# Patient Record
Sex: FEMALE | Race: WHITE | NOT HISPANIC OR LATINO | Employment: OTHER | URBAN - METROPOLITAN AREA
[De-identification: names, ages, dates, MRNs, and addresses within clinical notes are randomized per-mention and may not be internally consistent; named-entity substitution may affect disease eponyms.]

---

## 2021-10-18 ENCOUNTER — PREPPED CHART (OUTPATIENT)
Dept: URBAN - METROPOLITAN AREA CLINIC 19 | Facility: CLINIC | Age: 81
End: 2021-10-18

## 2022-04-19 ENCOUNTER — FOLLOW UP (OUTPATIENT)
Dept: URBAN - METROPOLITAN AREA CLINIC 19 | Facility: CLINIC | Age: 82
End: 2022-04-19

## 2022-04-19 DIAGNOSIS — S05.02XA: ICD-10-CM

## 2022-04-19 DIAGNOSIS — H57.12: ICD-10-CM

## 2022-04-19 PROCEDURE — 92071 CONTACT LENS FITTING FOR TX: CPT

## 2022-04-19 PROCEDURE — 99213 OFFICE O/P EST LOW 20 MIN: CPT

## 2022-04-19 ASSESSMENT — VISUAL ACUITY
OD_PH: 20/200
OD_CC: 20/400
OS_CC: 20/200
OU_CC: 20/100-2

## 2022-04-19 ASSESSMENT — TONOMETRY
OS_IOP_MMHG: 16
OD_IOP_MMHG: 10

## 2022-04-19 NOTE — PATIENT DISCUSSION
besivance tid OS, bandage cl inserted today.  keep in until Friday.  see Dr. Geovany Mejia next available.

## 2022-04-19 NOTE — PROCEDURE NOTE: CLINICAL
PROCEDURE NOTE: Bandage Contact Lens #1 OS. Diagnosis: Corneal Abrasion. A therapeutic soft contact lens of the of the appropriate size and base curve was selected and then applied to the cornea. The lens fit well and moved appropriately. Tara Moore

## 2022-04-22 ENCOUNTER — FOLLOW UP (OUTPATIENT)
Dept: URBAN - METROPOLITAN AREA CLINIC 19 | Facility: CLINIC | Age: 82
End: 2022-04-22

## 2022-04-22 DIAGNOSIS — H04.123: ICD-10-CM

## 2022-04-22 DIAGNOSIS — S05.02XD: ICD-10-CM

## 2022-04-22 PROCEDURE — 99213 OFFICE O/P EST LOW 20 MIN: CPT

## 2022-04-22 RX ORDER — ERYTHROMYCIN 5 MG/G: OINTMENT OPHTHALMIC TWICE A DAY

## 2022-04-22 ASSESSMENT — VISUAL ACUITY: OS_CC: 20/200

## 2022-04-22 NOTE — PATIENT DISCUSSION
bandage cl remove, continue artifical tears, start marifer for 1 week.  refer back to Dr. Flynn Nickerson for further eval.

## 2022-06-03 ENCOUNTER — FOLLOW UP (OUTPATIENT)
Dept: URBAN - METROPOLITAN AREA CLINIC 19 | Facility: CLINIC | Age: 82
End: 2022-06-03

## 2022-06-03 DIAGNOSIS — H04.123: ICD-10-CM

## 2022-06-03 DIAGNOSIS — H40.1133: ICD-10-CM

## 2022-06-03 DIAGNOSIS — H35.052: ICD-10-CM

## 2022-06-03 DIAGNOSIS — H18.833: ICD-10-CM

## 2022-06-03 DIAGNOSIS — H02.054: ICD-10-CM

## 2022-06-03 PROCEDURE — 99214 OFFICE O/P EST MOD 30 MIN: CPT

## 2022-06-03 RX ORDER — CYCLOSPORINE 0.5 MG/ML: 1 EMULSION OPHTHALMIC TWICE A DAY

## 2022-06-03 ASSESSMENT — VISUAL ACUITY
OS_CC: 20/200+1
OD_CC: CF 5FT

## 2022-06-03 ASSESSMENT — TONOMETRY
OS_IOP_MMHG: 12
OD_IOP_MMHG: 11

## 2022-06-03 NOTE — PATIENT DISCUSSION
(Stable, CPM) Intraocular pressures are well controlled with current treatment. Patient is tolerating the medications well. Continue with same treatment regimen.

## 2022-06-03 NOTE — PATIENT DISCUSSION
** Stop: Latanoprost -- due to eye irritation.  Will bring back for IOP check in a few weeks to make sure IOP is still stable.

## 2022-06-24 ENCOUNTER — ESTABLISHED PATIENT (OUTPATIENT)
Dept: URBAN - METROPOLITAN AREA CLINIC 20 | Facility: CLINIC | Age: 82
End: 2022-06-24

## 2022-06-24 DIAGNOSIS — H52.4: ICD-10-CM

## 2022-06-24 PROCEDURE — 92015 DETERMINE REFRACTIVE STATE: CPT

## 2022-06-24 PROCEDURE — 99211NC NO CHARGE VISIT

## 2022-06-24 ASSESSMENT — KERATOMETRY
OD_AXISANGLE2_DEGREES: 33
OS_K2POWER_DIOPTERS: 44.75
OD_K1POWER_DIOPTERS: 41.50
OS_K1POWER_DIOPTERS: 43.25
OS_AXISANGLE_DEGREES: 32
OD_AXISANGLE_DEGREES: 123
OD_K2POWER_DIOPTERS: 45.00
OS_AXISANGLE2_DEGREES: 122

## 2022-06-24 ASSESSMENT — TONOMETRY
OS_IOP_MMHG: 11
OD_IOP_MMHG: 11

## 2022-06-24 ASSESSMENT — VISUAL ACUITY
OU_SC: 20/60-2
OD_CC: 20/200
OS_CC: 20/100

## 2022-06-24 NOTE — PATIENT DISCUSSION
MRx only today; no change to Rx. advised switch to DVO/NVO, low vision referral if difficulty persists.

## 2022-08-05 ENCOUNTER — FOLLOW UP (OUTPATIENT)
Dept: URBAN - METROPOLITAN AREA CLINIC 19 | Facility: CLINIC | Age: 82
End: 2022-08-05

## 2022-08-05 DIAGNOSIS — H02.054: ICD-10-CM

## 2022-08-05 DIAGNOSIS — H04.123: ICD-10-CM

## 2022-08-05 DIAGNOSIS — H18.833: ICD-10-CM

## 2022-08-05 DIAGNOSIS — H40.1133: ICD-10-CM

## 2022-08-05 PROCEDURE — 99214 OFFICE O/P EST MOD 30 MIN: CPT

## 2022-08-05 ASSESSMENT — TONOMETRY
OD_IOP_MMHG: 10
OS_IOP_MMHG: 16

## 2022-08-05 ASSESSMENT — VISUAL ACUITY
OU_SC: 20/100
OD_SC: 20/200
OS_SC: 20/200

## 2022-08-26 ENCOUNTER — CONSULTATION/EVALUATION (OUTPATIENT)
Dept: URBAN - METROPOLITAN AREA CLINIC 20 | Facility: CLINIC | Age: 82
End: 2022-08-26

## 2022-08-26 DIAGNOSIS — H18.833: ICD-10-CM

## 2022-08-26 DIAGNOSIS — H18.231: ICD-10-CM

## 2022-08-26 PROCEDURE — 76514 ECHO EXAM OF EYE THICKNESS: CPT

## 2022-08-26 PROCEDURE — 99214 OFFICE O/P EST MOD 30 MIN: CPT

## 2022-08-26 ASSESSMENT — VISUAL ACUITY
OU_CC: 20/100-2
OU_SC: 20/200
OD_CC: 20/200+2
OS_CC: 20/100-2

## 2022-08-26 ASSESSMENT — TONOMETRY
OD_IOP_MMHG: 13
OS_IOP_MMHG: 18

## 2022-08-26 NOTE — PATIENT DISCUSSION
"** When reviewing her drops before leaving, she pulled out a bottle of Gentamicin and asked if they were the correct ones because those were what she has been using for ""a little while"".  I expect she has not been using the Pred Acetate at all

## 2022-08-26 NOTE — PATIENT DISCUSSION
* Patient was concerned about an elevated IOP at the last appointment with Dr. Trent Fonseca this week.  However, today she is stable and no abnormal findings related to IOP or nerve findings today.

## 2022-08-26 NOTE — PATIENT DISCUSSION
** Patient asked about a dermatologist recommendation from Dr. Penny Lacey He said [facility] is good and has a local office-- Dr. Rohan Gimenez -- The patient stated she already has seen that office and their wait is very long, but wanted another option as well to try to get in sooner.  Dr. Yen Patel said any others in the area  would be fine and once she gets an appointment to let us know to be able to send notes to them.

## 2022-08-26 NOTE — PATIENT DISCUSSION
* Patient shows increase in corneal edema since last visit on 8/5/22.   Referring her back to see Dr. Gudelia GARNETT to take a look.

## 2022-09-13 ENCOUNTER — FOLLOW UP (OUTPATIENT)
Dept: URBAN - METROPOLITAN AREA CLINIC 20 | Facility: CLINIC | Age: 82
End: 2022-09-13

## 2022-09-13 DIAGNOSIS — H18.231: ICD-10-CM

## 2022-09-13 DIAGNOSIS — H18.833: ICD-10-CM

## 2022-09-13 DIAGNOSIS — H02.054: ICD-10-CM

## 2022-09-13 DIAGNOSIS — H40.1133: ICD-10-CM

## 2022-09-13 DIAGNOSIS — Z94.7: ICD-10-CM

## 2022-09-13 DIAGNOSIS — H04.123: ICD-10-CM

## 2022-09-13 PROCEDURE — 92020 GONIOSCOPY: CPT

## 2022-09-13 PROCEDURE — 76514 ECHO EXAM OF EYE THICKNESS: CPT

## 2022-09-13 PROCEDURE — 99214 OFFICE O/P EST MOD 30 MIN: CPT

## 2022-09-13 RX ORDER — LATANOPROST 50 UG/ML
1 SOLUTION/ DROPS OPHTHALMIC EVERY EVENING
Start: 2022-09-13

## 2022-09-13 RX ORDER — DORZOLAMIDE HYDROCHLORIDE TIMOLOL MALEATE 20; 5 MG/ML; MG/ML
1 SOLUTION/ DROPS OPHTHALMIC TWICE A DAY
Start: 2022-09-13

## 2022-09-13 ASSESSMENT — VISUAL ACUITY
OS_CC: 20/100-1
OU_CC: 20/80-2
OD_CC: 20/200

## 2022-09-13 ASSESSMENT — TONOMETRY
OD_IOP_MMHG: 11
OS_IOP_MMHG: 32
OS_IOP_MMHG: 27

## 2022-09-13 NOTE — PATIENT DISCUSSION
* IOP elevated today OS.  Changing drop protocol based on pressure spike.  Will see back in 1 week for IOP.

## 2022-09-13 NOTE — PATIENT DISCUSSION
* Patient still shows increase in corneal edema (Pach ).    Pt saw Dr. Nida Landau on 8/31/22-- see notes--.

## 2022-09-23 ENCOUNTER — FOLLOW UP (OUTPATIENT)
Dept: URBAN - METROPOLITAN AREA CLINIC 20 | Facility: CLINIC | Age: 82
End: 2022-09-23

## 2022-09-23 DIAGNOSIS — H18.833: ICD-10-CM

## 2022-09-23 DIAGNOSIS — H04.123: ICD-10-CM

## 2022-09-23 DIAGNOSIS — H18.231: ICD-10-CM

## 2022-09-23 DIAGNOSIS — H40.1133: ICD-10-CM

## 2022-09-23 DIAGNOSIS — Z94.7: ICD-10-CM

## 2022-09-23 DIAGNOSIS — H02.054: ICD-10-CM

## 2022-09-23 PROCEDURE — 99213 OFFICE O/P EST LOW 20 MIN: CPT

## 2022-09-23 ASSESSMENT — TONOMETRY
OS_IOP_MMHG: 18
OD_IOP_MMHG: 10
OD_IOP_MMHG: 15

## 2022-09-23 ASSESSMENT — VISUAL ACUITY
OS_CC: 20/100
OU_CC: 20/80-2
OD_CC: 20/200

## 2022-09-23 NOTE — PATIENT DISCUSSION
* Patient still shows increase in corneal edema (Pach ).    Pt saw Dr. Sj Lou on 8/31/22-- see notes--.

## 2022-09-23 NOTE — PATIENT DISCUSSION
* IOP stable today, has responded well to drops added at last visit to OS.  Will continue at least until the patient can get in to see Dr. Dino Toure in November.

## 2022-09-23 NOTE — PATIENT DISCUSSION
* Dr. Jeannette Vidal saw patient on 8/31/22.  He is working on getting pt in with [facility] (Dr. Guido Colin) for opinion on tube placement status in OD.

## 2022-09-23 NOTE — PATIENT DISCUSSION
** Pt waiting for Klarity CL to be off back order.  We will put her on these for long term use// for now she can stay on Pred Acetate until is is off back order and available.

## 2022-10-07 ENCOUNTER — FOLLOW UP (OUTPATIENT)
Dept: URBAN - METROPOLITAN AREA CLINIC 19 | Facility: CLINIC | Age: 82
End: 2022-10-07

## 2022-10-07 DIAGNOSIS — H40.1133: ICD-10-CM

## 2022-10-07 PROCEDURE — 92012 INTRM OPH EXAM EST PATIENT: CPT

## 2022-10-07 PROCEDURE — 76514 ECHO EXAM OF EYE THICKNESS: CPT

## 2022-10-07 ASSESSMENT — VISUAL ACUITY
OS_CC: 20/200
OD_CC: 20/400

## 2022-10-07 ASSESSMENT — TONOMETRY
OD_IOP_MMHG: 9
OS_IOP_MMHG: 17

## 2022-10-07 NOTE — PATIENT DISCUSSION
PT did see DR Dino Toure. Dr Giorgi Damian and DR Dino Toure will be conferring on next move in PT care.

## 2022-10-28 ENCOUNTER — FOLLOW UP (OUTPATIENT)
Dept: URBAN - METROPOLITAN AREA CLINIC 20 | Facility: CLINIC | Age: 82
End: 2022-10-28

## 2022-10-28 DIAGNOSIS — H40.1133: ICD-10-CM

## 2022-10-28 DIAGNOSIS — H18.833: ICD-10-CM

## 2022-10-28 DIAGNOSIS — H01.02B: ICD-10-CM

## 2022-10-28 DIAGNOSIS — H04.123: ICD-10-CM

## 2022-10-28 DIAGNOSIS — H35.052: ICD-10-CM

## 2022-10-28 DIAGNOSIS — H01.02A: ICD-10-CM

## 2022-10-28 DIAGNOSIS — Z94.7: ICD-10-CM

## 2022-10-28 PROCEDURE — 92134 CPTRZ OPH DX IMG PST SGM RTA: CPT

## 2022-10-28 PROCEDURE — 99213 OFFICE O/P EST LOW 20 MIN: CPT

## 2022-10-28 ASSESSMENT — VISUAL ACUITY
OD_CC: 20/200
OU_CC: 20/200+1
OS_CC: 20/200+1

## 2022-10-28 ASSESSMENT — TONOMETRY
OD_IOP_MMHG: 10
OS_IOP_MMHG: 24
OD_IOP_MMHG: 10
OS_IOP_MMHG: 25

## 2022-10-28 NOTE — PATIENT DISCUSSION
* Dr Sherrie Guan agreed to move the shunt tube in anticipation of Corneal Transplant #2.  -- Will send an updated note to Dr. Sherrie Guan regarding the recent higher IOP measurements.

## 2022-10-28 NOTE — PATIENT DISCUSSION
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 Nida Landau.

## 2022-11-04 ENCOUNTER — EMERGENCY VISIT (OUTPATIENT)
Dept: URBAN - METROPOLITAN AREA CLINIC 20 | Facility: CLINIC | Age: 82
End: 2022-11-04

## 2022-11-04 DIAGNOSIS — Z94.7: ICD-10-CM

## 2022-11-04 DIAGNOSIS — H01.02B: ICD-10-CM

## 2022-11-04 DIAGNOSIS — H40.1133: ICD-10-CM

## 2022-11-04 DIAGNOSIS — H04.123: ICD-10-CM

## 2022-11-04 DIAGNOSIS — H01.02A: ICD-10-CM

## 2022-11-04 DIAGNOSIS — H35.052: ICD-10-CM

## 2022-11-04 DIAGNOSIS — H18.833: ICD-10-CM

## 2022-11-04 PROCEDURE — 99213 OFFICE O/P EST LOW 20 MIN: CPT

## 2022-11-04 PROCEDURE — 92134 CPTRZ OPH DX IMG PST SGM RTA: CPT

## 2022-11-04 ASSESSMENT — VISUAL ACUITY
OU_CC: 20/100
OS_CC: 20/100
OD_CC: 20/400

## 2022-11-04 ASSESSMENT — TONOMETRY
OD_IOP_MMHG: 9
OS_IOP_MMHG: 14

## 2022-11-04 NOTE — PATIENT DISCUSSION
* Dr Thais Fabry agreed to move the shunt tube in anticipation of Corneal Transplant #2.  -- Will send an updated note to Dr. Thais Fabry regarding the recent higher IOP measurements.

## 2022-11-04 NOTE — PATIENT DISCUSSION
fluctuating/dec vision OS noted by pt most c/w dryness as all other ocular health findings, including M.OCT appear stable at this time. re-advised importance of compliance w/ dryness tx and appropriate f/u w/ Dr Danna Elizabeth and Rocky Citizen as instructed.

## 2022-11-22 NOTE — PATIENT DISCUSSION
* Dr. Nida Landau saw patient on 8/31/22.  He is working on getting pt in with [facility] (Dr. Angie Benitez) for opinion on tube placement status in OD. eyeglasses

## 2022-12-02 ENCOUNTER — ESTABLISHED PATIENT (OUTPATIENT)
Dept: URBAN - METROPOLITAN AREA CLINIC 19 | Facility: CLINIC | Age: 82
End: 2022-12-02

## 2022-12-02 PROCEDURE — 99214 OFFICE O/P EST MOD 30 MIN: CPT

## 2022-12-02 RX ORDER — PILOCARPINE HYDROCHLORIDE 12.5 MG/ML
1 SOLUTION/ DROPS OPHTHALMIC TWICE A DAY
Start: 2022-12-02

## 2022-12-02 ASSESSMENT — VISUAL ACUITY
OU_SC: J16
OS_CC: 20/150-1
OU_CC: 20/150+1

## 2022-12-02 ASSESSMENT — TONOMETRY
OS_IOP_MMHG: 9
OD_IOP_MMHG: 10

## 2022-12-02 NOTE — PATIENT DISCUSSION
* Symptoms consistent with possible Temporal Arteritis.  -- Pain on left temporal side of head/ Light sensitivity OS -- Will send Referral to Dr. Justin Cooper for appt.

## 2022-12-02 NOTE — PATIENT DISCUSSION
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 Tiera Bridges.

## 2023-01-06 ENCOUNTER — ESTABLISHED PATIENT (OUTPATIENT)
Dept: URBAN - METROPOLITAN AREA CLINIC 19 | Facility: CLINIC | Age: 83
End: 2023-01-06

## 2023-01-06 DIAGNOSIS — H01.02A: ICD-10-CM

## 2023-01-06 DIAGNOSIS — M31.6: ICD-10-CM

## 2023-01-06 DIAGNOSIS — H18.231: ICD-10-CM

## 2023-01-06 DIAGNOSIS — Z94.7: ICD-10-CM

## 2023-01-06 DIAGNOSIS — H04.123: ICD-10-CM

## 2023-01-06 DIAGNOSIS — H01.02B: ICD-10-CM

## 2023-01-06 DIAGNOSIS — H40.1133: ICD-10-CM

## 2023-01-06 DIAGNOSIS — H35.052: ICD-10-CM

## 2023-01-06 PROCEDURE — 99213 OFFICE O/P EST LOW 20 MIN: CPT

## 2023-01-06 ASSESSMENT — TONOMETRY
OD_IOP_MMHG: 7
OS_IOP_MMHG: 11

## 2023-01-06 ASSESSMENT — VISUAL ACUITY: OS_CC: 20/150

## 2023-01-06 NOTE — PATIENT DISCUSSION
* Symptoms consistent with possible Temporal Arteritis.  -- Pain on left temporal side of head/ Light sensitivity OS -- Will send Referral to Dr. Bette Donovan for appt.

## 2023-04-14 ENCOUNTER — ESTABLISHED PATIENT (OUTPATIENT)
Dept: URBAN - METROPOLITAN AREA CLINIC 20 | Facility: CLINIC | Age: 83
End: 2023-04-14

## 2023-04-14 DIAGNOSIS — H35.052: ICD-10-CM

## 2023-04-14 DIAGNOSIS — Z94.7: ICD-10-CM

## 2023-04-14 PROCEDURE — 99213 OFFICE O/P EST LOW 20 MIN: CPT

## 2023-04-14 ASSESSMENT — VISUAL ACUITY
OD_SC: 20/400
OS_SC: 20/200

## 2023-04-14 ASSESSMENT — TONOMETRY
OS_IOP_MMHG: 14
OD_IOP_MMHG: 9

## 2023-07-07 ENCOUNTER — ESTABLISHED PATIENT (OUTPATIENT)
Dept: URBAN - METROPOLITAN AREA CLINIC 20 | Facility: CLINIC | Age: 83
End: 2023-07-07

## 2023-07-07 DIAGNOSIS — H02.054: ICD-10-CM

## 2023-07-07 PROCEDURE — 92012 INTRM OPH EXAM EST PATIENT: CPT

## 2023-07-07 ASSESSMENT — VISUAL ACUITY
OD_SC: 20/400
OS_SC: 20/200
OU_SC: 20/200

## 2023-07-07 ASSESSMENT — TONOMETRY
OS_IOP_MMHG: 17
OD_IOP_MMHG: 11

## 2023-07-12 ENCOUNTER — ESTABLISHED PATIENT (OUTPATIENT)
Dept: URBAN - METROPOLITAN AREA CLINIC 20 | Facility: CLINIC | Age: 83
End: 2023-07-12

## 2023-07-12 DIAGNOSIS — H43.813: ICD-10-CM

## 2023-07-12 DIAGNOSIS — H44.23: ICD-10-CM

## 2023-07-12 DIAGNOSIS — H47.293: ICD-10-CM

## 2023-07-12 DIAGNOSIS — H31.011: ICD-10-CM

## 2023-07-12 PROCEDURE — 92014 COMPRE OPH EXAM EST PT 1/>: CPT

## 2023-07-12 PROCEDURE — 92201 OPSCPY EXTND RTA DRAW UNI/BI: CPT

## 2023-07-12 PROCEDURE — 92134 CPTRZ OPH DX IMG PST SGM RTA: CPT

## 2023-07-12 ASSESSMENT — VISUAL ACUITY
OS_SC: 20/200
OD_SC: 20/200
OU_SC: 20/200

## 2023-07-12 ASSESSMENT — TONOMETRY
OD_IOP_MMHG: 10
OS_IOP_MMHG: 12

## 2023-07-26 ENCOUNTER — ESTABLISHED PATIENT (OUTPATIENT)
Dept: URBAN - METROPOLITAN AREA CLINIC 20 | Facility: CLINIC | Age: 83
End: 2023-07-26

## 2023-07-26 DIAGNOSIS — H47.293: ICD-10-CM

## 2023-07-26 DIAGNOSIS — H31.011: ICD-10-CM

## 2023-07-26 DIAGNOSIS — H43.813: ICD-10-CM

## 2023-07-26 DIAGNOSIS — H44.23: ICD-10-CM

## 2023-07-26 PROCEDURE — 92134 CPTRZ OPH DX IMG PST SGM RTA: CPT

## 2023-07-26 PROCEDURE — 99213 OFFICE O/P EST LOW 20 MIN: CPT

## 2023-07-26 ASSESSMENT — TONOMETRY
OD_IOP_MMHG: 15
OS_IOP_MMHG: 12

## 2023-07-26 ASSESSMENT — VISUAL ACUITY
OS_SC: 20/200
OD_SC: 20/200

## 2023-08-09 ENCOUNTER — ESTABLISHED PATIENT (OUTPATIENT)
Dept: URBAN - METROPOLITAN AREA CLINIC 20 | Facility: CLINIC | Age: 83
End: 2023-08-09

## 2023-08-09 DIAGNOSIS — H44.23: ICD-10-CM

## 2023-08-09 DIAGNOSIS — H43.813: ICD-10-CM

## 2023-08-09 DIAGNOSIS — H31.011: ICD-10-CM

## 2023-08-09 DIAGNOSIS — H47.293: ICD-10-CM

## 2023-08-09 PROCEDURE — 99213 OFFICE O/P EST LOW 20 MIN: CPT

## 2023-08-09 ASSESSMENT — TONOMETRY
OS_IOP_MMHG: 16
OD_IOP_MMHG: 14

## 2023-08-09 ASSESSMENT — VISUAL ACUITY
OS_CC: 20/200
OD_PH: 20/200-1
OD_CC: 20/400

## 2023-09-06 ENCOUNTER — ESTABLISHED PATIENT (OUTPATIENT)
Dept: URBAN - METROPOLITAN AREA CLINIC 20 | Facility: CLINIC | Age: 83
End: 2023-09-06

## 2023-09-06 DIAGNOSIS — H31.011: ICD-10-CM

## 2023-09-06 DIAGNOSIS — H47.293: ICD-10-CM

## 2023-09-06 DIAGNOSIS — H43.813: ICD-10-CM

## 2023-09-06 DIAGNOSIS — H44.23: ICD-10-CM

## 2023-09-06 PROCEDURE — 92134 CPTRZ OPH DX IMG PST SGM RTA: CPT

## 2023-09-06 PROCEDURE — 99213 OFFICE O/P EST LOW 20 MIN: CPT

## 2023-09-06 ASSESSMENT — VISUAL ACUITY
OD_SC: 20/400
OS_SC: 20/400
OS_PH: 20/200

## 2023-09-06 ASSESSMENT — TONOMETRY
OD_IOP_MMHG: 10
OS_IOP_MMHG: 14

## 2023-09-27 ENCOUNTER — FOLLOW UP (OUTPATIENT)
Dept: URBAN - METROPOLITAN AREA CLINIC 20 | Facility: CLINIC | Age: 83
End: 2023-09-27

## 2023-09-27 DIAGNOSIS — H44.23: ICD-10-CM

## 2023-09-27 DIAGNOSIS — H31.011: ICD-10-CM

## 2023-09-27 DIAGNOSIS — H47.293: ICD-10-CM

## 2023-09-27 DIAGNOSIS — H43.813: ICD-10-CM

## 2023-09-27 DIAGNOSIS — H53.9: ICD-10-CM

## 2023-09-27 PROCEDURE — 99213 OFFICE O/P EST LOW 20 MIN: CPT

## 2023-09-27 PROCEDURE — 92201 OPSCPY EXTND RTA DRAW UNI/BI: CPT

## 2023-09-27 PROCEDURE — 92134 CPTRZ OPH DX IMG PST SGM RTA: CPT

## 2023-09-27 ASSESSMENT — TONOMETRY
OD_IOP_MMHG: 9
OS_IOP_MMHG: 10

## 2023-09-27 ASSESSMENT — VISUAL ACUITY
OD_SC: CF 5FT
OS_SC: CF 5FT

## 2023-10-18 ENCOUNTER — FOLLOW UP (OUTPATIENT)
Dept: URBAN - METROPOLITAN AREA CLINIC 20 | Facility: CLINIC | Age: 83
End: 2023-10-18

## 2023-10-18 DIAGNOSIS — H44.23: ICD-10-CM

## 2023-10-18 DIAGNOSIS — H47.293: ICD-10-CM

## 2023-10-18 DIAGNOSIS — H31.011: ICD-10-CM

## 2023-10-18 DIAGNOSIS — H43.813: ICD-10-CM

## 2023-10-18 PROCEDURE — 99213 OFFICE O/P EST LOW 20 MIN: CPT

## 2023-10-18 PROCEDURE — 92134 CPTRZ OPH DX IMG PST SGM RTA: CPT

## 2023-10-18 ASSESSMENT — TONOMETRY
OD_IOP_MMHG: 08
OS_IOP_MMHG: 10

## 2023-10-18 ASSESSMENT — VISUAL ACUITY: OS_SC: 20/200

## 2023-10-30 ENCOUNTER — FOLLOW UP (OUTPATIENT)
Dept: URBAN - METROPOLITAN AREA CLINIC 20 | Facility: CLINIC | Age: 83
End: 2023-10-30

## 2023-10-30 DIAGNOSIS — H16.141: ICD-10-CM

## 2023-10-30 PROCEDURE — 99213 OFFICE O/P EST LOW 20 MIN: CPT

## 2023-10-30 PROCEDURE — 65778 COVER EYE W/MEMBRANE: CPT

## 2023-10-30 ASSESSMENT — VISUAL ACUITY
OD_SC: 20/200
OS_SC: 20/200

## 2023-10-30 ASSESSMENT — TONOMETRY
OS_IOP_MMHG: 10
OD_IOP_MMHG: 10

## 2023-11-06 ENCOUNTER — FOLLOW UP (OUTPATIENT)
Dept: URBAN - METROPOLITAN AREA CLINIC 20 | Facility: CLINIC | Age: 83
End: 2023-11-06

## 2023-11-06 DIAGNOSIS — H16.141: ICD-10-CM

## 2023-11-06 PROCEDURE — 99024 POSTOP FOLLOW-UP VISIT: CPT

## 2023-11-06 ASSESSMENT — VISUAL ACUITY
OS_SC: 20/200
OD_SC: 20/200

## 2023-11-06 ASSESSMENT — TONOMETRY
OD_IOP_MMHG: 10
OS_IOP_MMHG: 12

## 2023-11-27 ENCOUNTER — FOLLOW UP (OUTPATIENT)
Dept: URBAN - METROPOLITAN AREA CLINIC 20 | Facility: CLINIC | Age: 83
End: 2023-11-27

## 2023-11-27 DIAGNOSIS — H16.141: ICD-10-CM

## 2023-11-27 PROCEDURE — 99213 OFFICE O/P EST LOW 20 MIN: CPT

## 2023-11-27 ASSESSMENT — TONOMETRY
OD_IOP_MMHG: 7
OS_IOP_MMHG: 9

## 2023-11-27 ASSESSMENT — VISUAL ACUITY
OD_SC: 20/400
OS_SC: 20/200

## 2023-12-04 ENCOUNTER — FOLLOW UP (OUTPATIENT)
Dept: URBAN - METROPOLITAN AREA CLINIC 19 | Facility: CLINIC | Age: 83
End: 2023-12-04

## 2023-12-04 DIAGNOSIS — H52.13: ICD-10-CM

## 2023-12-04 PROCEDURE — 92015 DETERMINE REFRACTIVE STATE: CPT

## 2023-12-04 ASSESSMENT — KERATOMETRY
OD_AXISANGLE_DEGREES: 165
OS_AXISANGLE_DEGREES: 15
OS_AXISANGLE2_DEGREES: 105
OD_K2POWER_DIOPTERS: 54.00
OD_K1POWER_DIOPTERS: 47.00
OS_K1POWER_DIOPTERS: 44.00
OS_K2POWER_DIOPTERS: 46.25
OD_AXISANGLE2_DEGREES: 75

## 2023-12-04 ASSESSMENT — VISUAL ACUITY
OU_CC: 20/100
OD_CC: CF 2FT
OU_CC: 20/100
OD_CC: CF 2FT
OS_CC: 20/200+1
OS_CC: 20/200+1

## 2023-12-06 ENCOUNTER — FOLLOW UP (OUTPATIENT)
Dept: URBAN - METROPOLITAN AREA CLINIC 20 | Facility: CLINIC | Age: 83
End: 2023-12-06

## 2023-12-06 DIAGNOSIS — H47.293: ICD-10-CM

## 2023-12-06 DIAGNOSIS — H43.813: ICD-10-CM

## 2023-12-06 DIAGNOSIS — H31.011: ICD-10-CM

## 2023-12-06 PROCEDURE — 99213 OFFICE O/P EST LOW 20 MIN: CPT

## 2023-12-06 PROCEDURE — 92134 CPTRZ OPH DX IMG PST SGM RTA: CPT

## 2023-12-06 ASSESSMENT — VISUAL ACUITY
OD_SC: CF 2FT
OS_PH: 20/100-1
OS_SC: 20/200

## 2023-12-06 ASSESSMENT — TONOMETRY
OS_IOP_MMHG: 9
OD_IOP_MMHG: 9

## 2024-01-31 ENCOUNTER — FOLLOW UP (OUTPATIENT)
Dept: URBAN - METROPOLITAN AREA CLINIC 20 | Facility: CLINIC | Age: 84
End: 2024-01-31

## 2024-01-31 DIAGNOSIS — H31.011: ICD-10-CM

## 2024-01-31 DIAGNOSIS — H47.293: ICD-10-CM

## 2024-01-31 DIAGNOSIS — H43.813: ICD-10-CM

## 2024-01-31 DIAGNOSIS — H44.23: ICD-10-CM

## 2024-01-31 DIAGNOSIS — H44.2A2: ICD-10-CM

## 2024-01-31 PROCEDURE — 99213 OFFICE O/P EST LOW 20 MIN: CPT

## 2024-01-31 PROCEDURE — 92134 CPTRZ OPH DX IMG PST SGM RTA: CPT

## 2024-01-31 ASSESSMENT — TONOMETRY
OD_IOP_MMHG: 7
OS_IOP_MMHG: 9

## 2024-01-31 ASSESSMENT — VISUAL ACUITY: OS_CC: CF 3FT

## 2024-03-06 ENCOUNTER — COMPREHENSIVE EXAM (OUTPATIENT)
Dept: URBAN - METROPOLITAN AREA CLINIC 20 | Facility: CLINIC | Age: 84
End: 2024-03-06

## 2024-03-06 DIAGNOSIS — H43.813: ICD-10-CM

## 2024-03-06 DIAGNOSIS — H47.293: ICD-10-CM

## 2024-03-06 DIAGNOSIS — H40.1133: ICD-10-CM

## 2024-03-06 DIAGNOSIS — H31.011: ICD-10-CM

## 2024-03-06 DIAGNOSIS — H44.2A2: ICD-10-CM

## 2024-03-06 DIAGNOSIS — H44.23: ICD-10-CM

## 2024-03-06 PROCEDURE — 92014 COMPRE OPH EXAM EST PT 1/>: CPT

## 2024-03-06 PROCEDURE — 92134 CPTRZ OPH DX IMG PST SGM RTA: CPT

## 2024-03-06 PROCEDURE — 92201 OPSCPY EXTND RTA DRAW UNI/BI: CPT

## 2024-03-06 ASSESSMENT — TONOMETRY
OD_IOP_MMHG: 13
OS_IOP_MMHG: 13

## 2024-03-06 ASSESSMENT — VISUAL ACUITY
OS_CC: 20/200
OU_CC: 20/100-2
OD_CC: CF 2FT

## 2024-03-22 ENCOUNTER — EMERGENCY VISIT (OUTPATIENT)
Dept: URBAN - METROPOLITAN AREA CLINIC 20 | Facility: CLINIC | Age: 84
End: 2024-03-22

## 2024-03-22 DIAGNOSIS — H18.833: ICD-10-CM

## 2024-03-22 DIAGNOSIS — H16.223: ICD-10-CM

## 2024-03-22 DIAGNOSIS — H16.141: ICD-10-CM

## 2024-03-22 PROCEDURE — 65778 COVER EYE W/MEMBRANE: CPT

## 2024-03-22 PROCEDURE — 99214 OFFICE O/P EST MOD 30 MIN: CPT

## 2024-03-22 ASSESSMENT — TONOMETRY
OS_IOP_MMHG: 11
OD_IOP_MMHG: 8

## 2024-03-22 ASSESSMENT — VISUAL ACUITY
OU_SC: 20/200
OD_SC: 20/400

## 2024-03-28 ENCOUNTER — ESTABLISHED PATIENT (OUTPATIENT)
Dept: URBAN - METROPOLITAN AREA CLINIC 20 | Facility: CLINIC | Age: 84
End: 2024-03-28

## 2024-03-28 DIAGNOSIS — H16.141: ICD-10-CM

## 2024-03-28 PROCEDURE — 92071 CONTACT LENS FITTING FOR TX: CPT

## 2024-03-28 PROCEDURE — 99214 OFFICE O/P EST MOD 30 MIN: CPT

## 2024-03-28 ASSESSMENT — VISUAL ACUITY
OS_CC: 20/200
OD_CC: 20/400

## 2024-04-18 ENCOUNTER — ESTABLISHED PATIENT (OUTPATIENT)
Dept: URBAN - METROPOLITAN AREA CLINIC 20 | Facility: CLINIC | Age: 84
End: 2024-04-18

## 2024-04-18 DIAGNOSIS — H16.141: ICD-10-CM

## 2024-04-18 PROCEDURE — 99213 OFFICE O/P EST LOW 20 MIN: CPT

## 2024-04-18 RX ORDER — CYCLOSPORINE 0 MG/ML: 1 SOLUTION/ DROPS OPHTHALMIC; TOPICAL TWICE A DAY

## 2024-04-18 ASSESSMENT — TONOMETRY
OD_IOP_MMHG: 10
OS_IOP_MMHG: 12

## 2024-04-18 ASSESSMENT — VISUAL ACUITY
OD_CC: 20/200
OS_CC: 20/100

## 2024-05-30 ENCOUNTER — ESTABLISHED PATIENT (OUTPATIENT)
Dept: URBAN - METROPOLITAN AREA CLINIC 20 | Facility: CLINIC | Age: 84
End: 2024-05-30

## 2024-05-30 DIAGNOSIS — H16.223: ICD-10-CM

## 2024-05-30 PROCEDURE — 99214 OFFICE O/P EST MOD 30 MIN: CPT

## 2024-05-30 ASSESSMENT — VISUAL ACUITY
OD_SC: CF 2FT
OU_SC: 20/200
OS_SC: 20/200

## 2024-05-30 ASSESSMENT — TONOMETRY
OS_IOP_MMHG: 11
OD_IOP_MMHG: 10

## 2024-06-26 ENCOUNTER — EMERGENCY VISIT (OUTPATIENT)
Dept: URBAN - METROPOLITAN AREA CLINIC 20 | Facility: CLINIC | Age: 84
End: 2024-06-26

## 2024-06-26 DIAGNOSIS — H35.052: ICD-10-CM

## 2024-06-26 DIAGNOSIS — H43.813: ICD-10-CM

## 2024-06-26 DIAGNOSIS — H47.293: ICD-10-CM

## 2024-06-26 DIAGNOSIS — H31.011: ICD-10-CM

## 2024-06-26 PROCEDURE — 92134 CPTRZ OPH DX IMG PST SGM RTA: CPT

## 2024-06-26 PROCEDURE — 92014 COMPRE OPH EXAM EST PT 1/>: CPT

## 2024-06-26 ASSESSMENT — TONOMETRY
OS_IOP_MMHG: 9
OD_IOP_MMHG: 14

## 2024-06-26 ASSESSMENT — VISUAL ACUITY: OS_SC: 20/200

## 2024-07-15 ENCOUNTER — EMERGENCY VISIT (OUTPATIENT)
Dept: URBAN - METROPOLITAN AREA CLINIC 20 | Facility: CLINIC | Age: 84
End: 2024-07-15

## 2024-07-15 DIAGNOSIS — H16.223: ICD-10-CM

## 2024-07-15 PROCEDURE — 99213 OFFICE O/P EST LOW 20 MIN: CPT

## 2024-07-15 ASSESSMENT — VISUAL ACUITY
OD_SC: 20/800-1
OU_SC: 20/60
OS_SC: 20/100
OU_SC: 20/200-1
OS_SC: 20/400

## 2024-08-02 ENCOUNTER — EMERGENCY VISIT (OUTPATIENT)
Dept: URBAN - METROPOLITAN AREA CLINIC 19 | Facility: CLINIC | Age: 84
End: 2024-08-02

## 2024-08-02 DIAGNOSIS — H16.223: ICD-10-CM

## 2024-08-02 DIAGNOSIS — H16.141: ICD-10-CM

## 2024-08-02 PROCEDURE — 92285 EXTERNAL OCULAR PHOTOGRAPHY: CPT

## 2024-08-02 PROCEDURE — 99212 OFFICE O/P EST SF 10 MIN: CPT

## 2024-08-02 ASSESSMENT — VISUAL ACUITY
OU_SC: 20/200
OD_SC: CF 3FT

## 2024-08-27 ENCOUNTER — TECH ONLY (OUTPATIENT)
Dept: URBAN - METROPOLITAN AREA CLINIC 20 | Facility: CLINIC | Age: 84
End: 2024-08-27

## 2024-08-27 DIAGNOSIS — H40.1133: ICD-10-CM

## 2024-08-27 PROCEDURE — 99211T TECH SERVICE: Mod: NC

## 2024-09-04 ENCOUNTER — COMPREHENSIVE EXAM (OUTPATIENT)
Dept: URBAN - METROPOLITAN AREA CLINIC 20 | Facility: CLINIC | Age: 84
End: 2024-09-04

## 2024-09-04 DIAGNOSIS — H43.813: ICD-10-CM

## 2024-09-04 DIAGNOSIS — H31.011: ICD-10-CM

## 2024-09-04 DIAGNOSIS — H47.293: ICD-10-CM

## 2024-09-04 PROCEDURE — 92014 COMPRE OPH EXAM EST PT 1/>: CPT

## 2024-09-04 PROCEDURE — 92134 CPTRZ OPH DX IMG PST SGM RTA: CPT

## 2024-09-04 PROCEDURE — 92201 OPSCPY EXTND RTA DRAW UNI/BI: CPT

## 2025-02-05 ENCOUNTER — EMERGENCY VISIT (OUTPATIENT)
Age: 85
End: 2025-02-05

## 2025-02-05 DIAGNOSIS — H43.813: ICD-10-CM

## 2025-02-05 DIAGNOSIS — H53.8: ICD-10-CM

## 2025-02-05 DIAGNOSIS — H31.011: ICD-10-CM

## 2025-02-05 DIAGNOSIS — H47.293: ICD-10-CM

## 2025-02-05 PROCEDURE — 99213 OFFICE O/P EST LOW 20 MIN: CPT

## 2025-02-05 PROCEDURE — G2211 COMPLEX E/M VISIT ADD ON: HCPCS

## 2025-02-05 PROCEDURE — 92134 CPTRZ OPH DX IMG PST SGM RTA: CPT

## 2025-03-10 ENCOUNTER — FOLLOW UP (OUTPATIENT)
Age: 85
End: 2025-03-10

## 2025-03-10 DIAGNOSIS — H40.1133: ICD-10-CM

## 2025-03-10 PROCEDURE — 99214 OFFICE O/P EST MOD 30 MIN: CPT

## 2025-05-15 ENCOUNTER — EMERGENCY VISIT (OUTPATIENT)
Age: 85
End: 2025-05-15

## 2025-05-15 DIAGNOSIS — H40.1133: ICD-10-CM

## 2025-05-15 DIAGNOSIS — H16.223: ICD-10-CM

## 2025-05-15 PROCEDURE — 99214 OFFICE O/P EST MOD 30 MIN: CPT

## 2025-06-09 ENCOUNTER — COMPREHENSIVE EXAM (OUTPATIENT)
Age: 85
End: 2025-06-09

## 2025-06-09 DIAGNOSIS — H52.13: ICD-10-CM

## 2025-06-09 DIAGNOSIS — H40.1133: ICD-10-CM

## 2025-06-09 DIAGNOSIS — H16.143: ICD-10-CM

## 2025-06-09 DIAGNOSIS — H31.011: ICD-10-CM

## 2025-06-09 DIAGNOSIS — H16.223: ICD-10-CM

## 2025-06-09 PROCEDURE — 92012 INTRM OPH EXAM EST PATIENT: CPT

## 2025-06-09 PROCEDURE — 92015 DETERMINE REFRACTIVE STATE: CPT | Mod: NC

## 2025-06-09 RX ORDER — ERYTHROMYCIN 5 MG/G: OINTMENT OPHTHALMIC

## 2025-06-25 ENCOUNTER — FOLLOW UP (OUTPATIENT)
Age: 85
End: 2025-06-25

## 2025-06-25 PROCEDURE — 92134 CPTRZ OPH DX IMG PST SGM RTA: CPT

## 2025-07-18 ENCOUNTER — EMERGENCY VISIT (OUTPATIENT)
Age: 85
End: 2025-07-18

## 2025-07-18 DIAGNOSIS — S05.02XA: ICD-10-CM

## 2025-07-18 PROCEDURE — 99213 OFFICE O/P EST LOW 20 MIN: CPT

## 2025-07-18 PROCEDURE — 92071 CONTACT LENS FITTING FOR TX: CPT

## 2025-07-21 ENCOUNTER — FOLLOW UP (OUTPATIENT)
Age: 85
End: 2025-07-21

## 2025-07-21 DIAGNOSIS — S05.02XD: ICD-10-CM

## 2025-07-21 PROCEDURE — 99212 OFFICE O/P EST SF 10 MIN: CPT

## 2025-07-21 RX ORDER — ERYTHROMYCIN 5 MG/G: OINTMENT OPHTHALMIC TWICE A DAY

## 2025-07-24 ENCOUNTER — FOLLOW UP (OUTPATIENT)
Age: 85
End: 2025-07-24

## 2025-07-24 DIAGNOSIS — H40.1133: ICD-10-CM

## 2025-07-24 PROCEDURE — 99214 OFFICE O/P EST MOD 30 MIN: CPT

## 2025-07-30 ENCOUNTER — EMERGENCY VISIT (OUTPATIENT)
Age: 85
End: 2025-07-30

## 2025-07-30 DIAGNOSIS — H16.223: ICD-10-CM

## 2025-07-30 PROCEDURE — 99213 OFFICE O/P EST LOW 20 MIN: CPT

## 2025-08-11 ENCOUNTER — CLINIC PROCEDURE ONLY (OUTPATIENT)
Age: 85
End: 2025-08-11

## 2025-08-11 DIAGNOSIS — H40.1133: ICD-10-CM

## 2025-08-11 PROCEDURE — 65855 TRABECULOPLASTY LASER SURG: CPT

## 2025-08-18 ENCOUNTER — CLINIC PROCEDURE ONLY (OUTPATIENT)
Age: 85
End: 2025-08-18

## 2025-08-18 DIAGNOSIS — H40.1133: ICD-10-CM

## 2025-08-18 PROCEDURE — 65855 TRABECULOPLASTY LASER SURG: CPT
